# Patient Record
Sex: FEMALE | URBAN - METROPOLITAN AREA
[De-identification: names, ages, dates, MRNs, and addresses within clinical notes are randomized per-mention and may not be internally consistent; named-entity substitution may affect disease eponyms.]

---

## 2023-10-02 ENCOUNTER — TELEPHONE ENCOUNTER (OUTPATIENT)
Dept: URBAN - METROPOLITAN AREA CLINIC 98 | Facility: CLINIC | Age: 43
End: 2023-10-02

## 2023-10-03 ENCOUNTER — TELEPHONE ENCOUNTER (OUTPATIENT)
Dept: URBAN - METROPOLITAN AREA CLINIC 98 | Facility: CLINIC | Age: 43
End: 2023-10-03

## 2023-10-05 ENCOUNTER — WEB ENCOUNTER (OUTPATIENT)
Dept: URBAN - METROPOLITAN AREA CLINIC 98 | Facility: CLINIC | Age: 43
End: 2023-10-05

## 2023-10-06 ENCOUNTER — OFFICE VISIT (OUTPATIENT)
Dept: URBAN - METROPOLITAN AREA CLINIC 98 | Facility: CLINIC | Age: 43
End: 2023-10-06
Payer: COMMERCIAL

## 2023-10-06 VITALS
HEART RATE: 93 BPM | HEIGHT: 66 IN | BODY MASS INDEX: 28.67 KG/M2 | SYSTOLIC BLOOD PRESSURE: 154 MMHG | WEIGHT: 178.4 LBS | DIASTOLIC BLOOD PRESSURE: 93 MMHG | TEMPERATURE: 97 F

## 2023-10-06 DIAGNOSIS — Z33.1 CURRENT PREGNANCY DETERMINED BY HISTORY: ICD-10-CM

## 2023-10-06 DIAGNOSIS — D13.4 HEPATIC ADENOMA: ICD-10-CM

## 2023-10-06 PROCEDURE — 99245 OFF/OP CONSLTJ NEW/EST HI 55: CPT | Performed by: INTERNAL MEDICINE

## 2023-10-06 PROCEDURE — 99205 OFFICE O/P NEW HI 60 MIN: CPT | Performed by: INTERNAL MEDICINE

## 2023-10-06 NOTE — HPI-TODAY'S VISIT:
Here on urgent request from OB Dr Lurdes Brian for evaluation of hepatic adenomas in setting of pregnancy - at 35 wks today. Dr Brian needed an urgent hepatology opinion prior to delivery - if there is significant risk for rupture of adenoma with imminent delivery.  A copy of this note will be sent to Dr Brian.  This is her 7th pregnancy but 3rd child - other children are 4.4yo and 2.6 yo This was an ivf pregnancy after several trisomy 16 losses Pt known to have adenomas for years ; treated in [place] w ablations - followed by Dr Martines in [place] but not in several years.  She feels well ; no abdominal pain  not clear what she will do for contraception after delivery but she has avoided use of estrogen compounds since diagnosis of adenomas

## 2023-12-06 ENCOUNTER — LAB OUTSIDE AN ENCOUNTER (OUTPATIENT)
Dept: URBAN - METROPOLITAN AREA CLINIC 98 | Facility: CLINIC | Age: 43
End: 2023-12-06

## 2023-12-22 ENCOUNTER — LAB OUTSIDE AN ENCOUNTER (OUTPATIENT)
Dept: URBAN - METROPOLITAN AREA CLINIC 98 | Facility: CLINIC | Age: 43
End: 2023-12-22

## 2023-12-22 ENCOUNTER — TELEPHONE ENCOUNTER (OUTPATIENT)
Dept: URBAN - METROPOLITAN AREA CLINIC 98 | Facility: CLINIC | Age: 43
End: 2023-12-22

## 2024-01-07 NOTE — HPI-OTHER HISTORIES
US NS 9/2023  REPORT ULTRASOUND RIGHT UPPER QUADRANT ABDOMEN  CLINICAL HISTORY: Benign neoplasm of liver  COMPARISON: No prior study for comparison.  FINDINGS: Liver: Normal size, contour, and echogenicity. There is a hyperechoic focus in the right lobe of the liver measuring 4.8 x 4.1 x 5.3 cm with internal vascularity. Main portal vein and hepatic veins are patent, with appropriate direction of flow. Intra and Extrahepatic Bile Ducts: Normal.  CBD measures 6 mm in diameter. Gallbladder: Cholecystectomy Pancreas: The visualized portions of pancreas appear normal. The pancreatic tail is obscured by bowel gas. Right Kidney: Measures 11.2 cm in long axis. Normal size and echogenicity. No hydronephrosis. No masses. No calculi. Ascites: None.   IMPRESSION:  1. 5.3 cm hyperechoic liver lesion is incompletely assessed by ultrasound. This may reflect a hemangioma or adenoma. A more aggressive process is considered less likely given the patient's age. Consider comparison to prior exams or further evaluation with abdominal MRI without and with contrast when the patient is no longer pregnant. 2. Cholecystectomy  US [place] 2020 PROCEDURE: LIMITED RIGHT UPPER QUADRANT ABDOMINAL ULTRASOUND  HISTORY: 40-year-old female, currently pregnant, with history of hepatic adenomas.   COMPARISON: Prior Limited abdominal ultrasound from 5/30/2019, 1/7/2019, and 7/12/2018  TECHNIQUE: Imaging of the abdomen was performed in transverse and longitudinal planes with real time ultrasonography.   FINDINGS:   The pancreatic head, neck, and body are within normal limits. The tail is obscured by overlapping structures.  Normal echogenicity and appearance of the liver. Normal hepatic vein and portal venous flow. No focal liver lesions.  The gallbladder is not seen. The common bile duct measures 4 mm, previously 8 mm.  The right kidney measures 11.4 cm in craniocaudal dimension with normal cortical echogenicity and normal cortical thickness.  The visualized upper abdominal aorta and IVC are unremarkable. No ascites.  IMPRESSION:  The previously demonstrated echogenic liver lesions are not seen on today's exam. If clinically indicated, an MRI of the liver with Eovist can be considered for further evaluation.  Status post cholecystectomy with decreasing size of common bile duct, now measuring 4 mm.   [facility] 2019  Study Result Clinical History: Hepatic adenoma. Prior cholecystectomy.  Findings:  Comparison Study: 01/07/2019.  Multiple realtime images of the right upper quadrant demonstrated nonvisualization of the gallbladder consistent with prior cholecystectomy. The common duct is mildly dilated measuring 8 mm at the kristy. The distal duct is obscured by bowel gas The liver showed a 2.2 x 2.1 x 2.1 cm mildly hyperechoic focus in the right lobe. An adjacent 1 x 1.2 x 1.1 cm mildly hyperechoic focus is identified. The size has shown no gross change. Additional small scattered echogenic foci in the liver measuring up to 1 cm.  Imaged pancreas appeared unremarkable.  The right kidney measured 11.2 cm in length and showed no hydronephrosis. Renal echogenicity is within normal limits.  Opinion:  1. Status post cholecystectomy. Mildly dilated common duct may be related to prior cholecystectomy and does not necessarily indicate biliary obstruction. 2. History of hepatic adenoma. Previously seen 2 echogenic foci in the right lobe of liver remaining approximately stable. Additional small hyperintense foci are seen, nonspecific. MRI with Eovist may be considered for further characterization.    [facility] Multidisciplinary Hepatobiliary Conference Summary Report   Ladi Goins was presented and discussed at our biweekly multidisciplinary hepatobiliary conference today.  We presented and reviewed the patients most recent imaging (Ultrasound studies) from 09/21/2023.   The images revealed 5.3 cm mass.     The multidisciplinary care plan is Dr. Myers to see in clinic 10/9/2023. No follow up imaging at this time.    Care plan routed to care team and patient's coordinator to call and discuss.     Carmen Chi RN [facility]   CC: No ref. provider found Messi Barrera
ambulatory

## 2024-01-15 ENCOUNTER — LAB OUTSIDE AN ENCOUNTER (OUTPATIENT)
Dept: URBAN - METROPOLITAN AREA CLINIC 98 | Facility: CLINIC | Age: 44
End: 2024-01-15

## 2024-02-09 ENCOUNTER — TELEP (OUTPATIENT)
Dept: URBAN - METROPOLITAN AREA TELEHEALTH 2 | Facility: TELEHEALTH | Age: 44
End: 2024-02-09
Payer: COMMERCIAL

## 2024-02-09 VITALS — WEIGHT: 165 LBS | BODY MASS INDEX: 26.52 KG/M2 | HEIGHT: 66 IN

## 2024-02-09 DIAGNOSIS — D13.4 HEPATIC ADENOMA: ICD-10-CM

## 2024-02-09 DIAGNOSIS — K76.89 FOCAL NODULAR HYPERPLASIA OF LIVER: ICD-10-CM

## 2024-02-09 PROBLEM — 278527001: Status: ACTIVE | Noted: 2024-02-09

## 2024-02-09 PROCEDURE — 99214 OFFICE O/P EST MOD 30 MIN: CPT | Performed by: INTERNAL MEDICINE

## 2024-02-09 NOTE — HPI-OTHER HISTORIES
HEre to follow up for liver lesion: fast delivery , 9.5 lb at birth had bad tongue tie and lip tie - hard time with nursing  circumcision couldn't be done until 3 wk after - complicated by hemorrhage  testing now for coagulopathy - has a FH spherocytosis . she did labs but through her PCP : CMP wnl - 13lbs now 3month.after delivery - no more pain in "liver" considering another child in 1 year.  . MRI [place] 2024 Jan 23  MRI ABDOMEN WITH CONTRAST:Liver is noncirrhotic in morphology. Mildly T2 hyperintense enhancing lesion within the right hepatic lobe,segment 5/6 measuring 5.1 x 4.4 x 5 cm (series 12 image 37). Thisdemonstrates avid arterial phase enhancement and remains slightlyhyperintense to background liver parenchyma on the delayed phasescompatible with Eovist retention. Imaging pattern is most consistentwith an FNH. No additional focal hepatic lesion identified. Central hepatic veins, portal veins, SMV, and splenic vein are patent. Spleen is normal in size. T2 hyperintense splenic cyst anteriorlymeasuring 2 cm (series 7 image 16), without appreciable internalenhancement. Additional smaller T2 hyperintense nonenhancing spleniccysts (series 7 image 33, 31, 29). No adrenal nodularity. Pancreas enhances homogeneously without ductal dilatation or discretepancreatic mass. Gallbladder is absent. No biliary ductal dilatation. No evidence ofcholedocholithiasis. Kidneys enhance symmetrically without hydronephrosis or solid enhancingrenal mass. Abdominal aorta is not aneurysmal. Celiac artery, SMA, renal arteries,and proximal SAVANNAH are patent. No pathologic mesenteric or retroperitoneal lymphadenopathy. No gross bowel dilatation. No abnormal marrow signal. IMPRESSION: 1.  Findings consistent with an FNH in the right hepatic lobe measuring5.1 x 4.4 x 5 cm. This corresponds to the lesion seen on the most recentprior outside ultrasound from 9/21/2023. 2.  No additional focal hepatic lesion identified. Liver is noncirrhoticin morphology. Patent hepatic vasculature. 3.  Incidental finding of multiple nonenhancing splenic cysts measuringup to 2 cm. 4.  Status post cholecystectomy. No biliary ductal dilatation.

## 2024-02-15 LAB
A/G RATIO: 2
AFP, SERUM, TUMOR MARKER: 4.2
ALBUMIN: 4.7
ALKALINE PHOSPHATASE: 85
ALT (SGPT): 42
AST (SGOT): 31
BASO (ABSOLUTE): 0
BASOS: 0
BILIRUBIN, DIRECT: 0.16
BILIRUBIN, TOTAL: 0.6
BUN/CREATININE RATIO: 14
BUN: 10
CALCIUM: 9.3
CARBON DIOXIDE, TOTAL: 23
CHLORIDE: 102
CREATININE: 0.7
EGFR: 111
EOS (ABSOLUTE): 0
EOS: 1
GLOBULIN, TOTAL: 2.4
GLUCOSE: 85
HEMATOCRIT: 41
HEMATOLOGY COMMENTS:: (no result)
HEMOGLOBIN: 13.5
IMMATURE CELLS: (no result)
IMMATURE GRANS (ABS): 0
IMMATURE GRANULOCYTES: 0
LYMPHS (ABSOLUTE): 1.9
LYMPHS: 38
MCH: 31.7
MCHC: 32.9
MCV: 96
MONOCYTES(ABSOLUTE): 0.4
MONOCYTES: 7
NEUTROPHILS (ABSOLUTE): 2.8
NEUTROPHILS: 54
NRBC: (no result)
PLATELETS: 311
POTASSIUM: 4.9
PROTEIN, TOTAL: 7.1
RBC: 4.26
RDW: 12
SODIUM: 142
WBC: 5.2

## 2024-08-05 ENCOUNTER — DASHBOARD ENCOUNTERS (OUTPATIENT)
Age: 44
End: 2024-08-05

## 2024-08-05 ENCOUNTER — OFFICE VISIT (OUTPATIENT)
Dept: URBAN - METROPOLITAN AREA CLINIC 98 | Facility: CLINIC | Age: 44
End: 2024-08-05
Payer: COMMERCIAL

## 2024-08-05 VITALS
SYSTOLIC BLOOD PRESSURE: 139 MMHG | HEIGHT: 66 IN | DIASTOLIC BLOOD PRESSURE: 83 MMHG | HEART RATE: 94 BPM | TEMPERATURE: 97.2 F | BODY MASS INDEX: 26.2 KG/M2 | WEIGHT: 163 LBS

## 2024-08-05 DIAGNOSIS — R10.9 ABDOMINAL CRAMPS: ICD-10-CM

## 2024-08-05 DIAGNOSIS — K52.89 (LYMPHOCYTIC) MICROSCOPIC COLITIS: ICD-10-CM

## 2024-08-05 PROCEDURE — 99214 OFFICE O/P EST MOD 30 MIN: CPT | Performed by: INTERNAL MEDICINE

## 2024-08-05 NOTE — HPI-TODAY'S VISIT:
Ms. Goins is a 43 yo F presenting for followup for diarrhea.  Last seen by Dr. Boone for liver lesion on 2/9/24  She had a spider bite in June and developed an infection and cellulitis  Put on clindamycin, 2 weeks later developed diarrhea For 3 weeks she has been having 6-8 loose BMs per day Having some mucus with this in the last week No blood in stools Went to urgent care and they recommended seeing GI No weight loss Has some abdominal cramping with BMs    I reviewed:  MRI abdomen 1/23/24 2/14/24 AFP: 4.2

## 2024-08-06 ENCOUNTER — LAB OUTSIDE AN ENCOUNTER (OUTPATIENT)
Dept: URBAN - METROPOLITAN AREA CLINIC 98 | Facility: CLINIC | Age: 44
End: 2024-08-06

## 2024-08-08 LAB
ALBUMIN: 4.6
ALKALINE PHOSPHATASE: 95
ALT (SGPT): 21
AST (SGOT): 20
BILIRUBIN, TOTAL: 0.4
BUN/CREATININE RATIO: 17
BUN: 11
CALCIUM: 9.5
CARBON DIOXIDE, TOTAL: 22
CHLORIDE: 101
CREATININE: 0.64
DEAMIDATED GLIADIN ABS, IGA: 2
DEAMIDATED GLIADIN ABS, IGG: 2
EGFR: 113
ENDOMYSIAL ANTIBODY IGA: NEGATIVE
GLOBULIN, TOTAL: 2.6
GLUCOSE: 83
IMMUNOGLOBULIN A, QN, SERUM: 124
POTASSIUM: 4
PROTEIN, TOTAL: 7.2
SODIUM: 137
T-TRANSGLUTAMINASE (TTG) IGA: <2
T-TRANSGLUTAMINASE (TTG) IGG: <2

## 2024-08-09 ENCOUNTER — WEB ENCOUNTER (OUTPATIENT)
Dept: URBAN - METROPOLITAN AREA CLINIC 98 | Facility: CLINIC | Age: 44
End: 2024-08-09

## 2024-08-09 ENCOUNTER — TELEPHONE ENCOUNTER (OUTPATIENT)
Dept: URBAN - METROPOLITAN AREA CLINIC 98 | Facility: CLINIC | Age: 44
End: 2024-08-09

## 2024-08-09 LAB
ADENOVIRUS F 40/41: NOT DETECTED
C. DIFFICILE CHEK (GDH), STOOL - QDX: POSITIVE
C. DIFFICILE TOXIN A/B, STOOL - QDX: POSITIVE
CALPROTECTIN, STOOL - QDX: (no result)
CAMPYLOBACTER: NOT DETECTED
CLOSTRIDIUM DIFFICILE: DETECTED
ENTAMOEBA HISTOLYTICA: NOT DETECTED
ENTEROAGGREGATIVE E.COLI: NOT DETECTED
ENTEROTOXIGENIC E.COLI: NOT DETECTED
ESCHERICHIA COLI O157: NOT DETECTED
GIARDIA LAMBLIA: NOT DETECTED
NOROVIRUS GI/GII: NOT DETECTED
PANCREATICELASTASE ELISA, STOOL: (no result)
ROTAVIRUS A: NOT DETECTED
SALMONELLA SPP.: NOT DETECTED
SHIGA-LIKE TOXIN PRODUCING E.COLI: NOT DETECTED
SHIGELLA SPP. / ENTEROINVASIVE E.COLI: NOT DETECTED
VIBRIO PARAHAEMOLYTICUS: NOT DETECTED
VIBRIO SPP.: NOT DETECTED
YERSINIA ENTEROCOLITICA: NOT DETECTED

## 2024-08-09 RX ORDER — VANCOMYCIN HYDROCHLORIDE 125 MG/1
1 CAPSULE CAPSULE ORAL
Qty: 40 CAPSULE | Refills: 0 | OUTPATIENT
Start: 2024-08-09 | End: 2024-08-19

## 2024-09-05 ENCOUNTER — OFFICE VISIT (OUTPATIENT)
Dept: URBAN - METROPOLITAN AREA CLINIC 98 | Facility: CLINIC | Age: 44
End: 2024-09-05
Payer: COMMERCIAL

## 2024-09-05 VITALS
BODY MASS INDEX: 25.43 KG/M2 | DIASTOLIC BLOOD PRESSURE: 79 MMHG | HEIGHT: 66 IN | HEART RATE: 94 BPM | TEMPERATURE: 97.1 F | WEIGHT: 158.2 LBS | SYSTOLIC BLOOD PRESSURE: 122 MMHG

## 2024-09-05 DIAGNOSIS — R10.84 ABDOMINAL PAIN, GENERALIZED: ICD-10-CM

## 2024-09-05 DIAGNOSIS — K59.1 CHRONIC FUNCTIONAL DIARRHEA: ICD-10-CM

## 2024-09-05 DIAGNOSIS — A04.72 C. DIFFICILE: ICD-10-CM

## 2024-09-05 PROCEDURE — 99214 OFFICE O/P EST MOD 30 MIN: CPT | Performed by: INTERNAL MEDICINE

## 2024-09-05 NOTE — HPI-TODAY'S VISIT:
Ms. Goins is a 45 yo F presenting for followup for diarrhea. Positive for c.diff.  8/5/24- last visit with me.  Felt better after vancomycin Having solid stools now- bristol 3-4 Having 1 BM per day, no blood Feels that dairy and gluten make her have more watery stools.    I reviewed:  8/5/24 celiac testing: negative  8/5/24 CMP: normal 8/6/24 Cdiff PCR, toxin pos, fecal ar elevated to 60s MRI abdomen 1/23/24 2/14/24 AFP: 4.2

## 2024-10-29 ENCOUNTER — TELEPHONE ENCOUNTER (OUTPATIENT)
Dept: URBAN - METROPOLITAN AREA CLINIC 98 | Facility: CLINIC | Age: 44
End: 2024-10-29

## 2024-10-29 NOTE — HPI-TODAY'S VISIT:
[facility]    EXAMINATION: MRI ABDOMEN W WO CONTRAST \~ HISTORY: History of hepatic adenomas status post resection. Follow-up liver lesion. \~ COMPARISON: MRI abdomen 1/23/2024, ultrasound 9/21/2023 \~ TECHNIQUE: Multiplanar and multisequence magnetic resonance imaging of the abdomen was performed both prior to and following the administration of 6.4 cc of Vueway contrast. There were no reported immediate complications. \~ FINDINGS: \~ No cardiomegaly or pericardial effusion. Imaged lung bases are clear. \~ Normal liver morphology. No significant iron or fat deposition within the liver. Stable 5.3 x 4.1 cm arterially enhancing, T2 hyperintense and T1 hypointense hepatic segment 5/6 lesion. Previously this demonstrated retention of contrast on the prior hepatobiliary phase. No new hepatic lesions are noted. \~ Spleen is normal in size. T2 hyperintense splenic cysts. The largest measures 1.4 cm and is decreased in size, previously measuring 2.0 cm. Status post cholecystectomy. No biliary ductal dilation. Pancreas is normal. Adrenal glands are normal. Kidneys are normal. \~ Normal caliber of the abdominal aorta and inferior vena cava. Mesenteric vasculature is patent. No lymphadenopathy within the abdomen. No ascites. No obstruction or pathological thickening of the imaged portions of the bowel. \~ No acute osseous abnormalities. \~ IMPRESSION: \~ Stable enhancing right hepatic lobe lesion. This is favored to reflect focal nodular hyperplasia, although given the history of prior hepatic adenomas a hepatic adenoma is not excluded (inflammatory and beta catenin subtypes can retain contrast on the hepatic biliary phase). No new liver lesions are noted. Morphologically the liver is normal. \~

## 2024-11-02 ENCOUNTER — WEB ENCOUNTER (OUTPATIENT)
Dept: URBAN - METROPOLITAN AREA CLINIC 98 | Facility: CLINIC | Age: 44
End: 2024-11-02

## 2024-11-04 ENCOUNTER — OFFICE VISIT (OUTPATIENT)
Dept: URBAN - METROPOLITAN AREA TELEHEALTH 2 | Facility: TELEHEALTH | Age: 44
End: 2024-11-04
Payer: COMMERCIAL

## 2024-11-04 VITALS — BODY MASS INDEX: 25.39 KG/M2 | WEIGHT: 158 LBS | HEIGHT: 66 IN

## 2024-11-04 DIAGNOSIS — D13.4 HEPATIC ADENOMA: ICD-10-CM

## 2024-11-04 DIAGNOSIS — K76.89 FOCAL NODULAR HYPERPLASIA OF LIVER: ICD-10-CM

## 2024-11-04 DIAGNOSIS — Z31.69 PROCREATIVE MANAGEMENT COUNSELING: ICD-10-CM

## 2024-11-04 PROCEDURE — 99214 OFFICE O/P EST MOD 30 MIN: CPT | Performed by: INTERNAL MEDICINE

## 2024-11-04 NOTE — PHYSICAL EXAM NEUROLOGIC:
oriented to person, place and time ; short and long term memory intact
Patient is not pregnant (male or female)

## 2024-11-04 NOTE — HPI-OTHER HISTORIES
10/2024  \*Unknown; EXAMINATION: MRI ABDOMEN W WO CONTRAST \~ HISTORY: History of hepatic adenomas status post resection. Follow-up liver lesion. \~ COMPARISON: MRI abdomen 1/23/2024, ultrasound 9/21/2023 \~ TECHNIQUE: Multiplanar and multisequence magnetic resonance imaging of the abdomen was performed both prior to and following the administration of 6.4 cc of Vueway contrast. There were no reported immediate complications. \~ FINDINGS: \~ No cardiomegaly or pericardial effusion. Imaged lung bases are clear. \~ Normal liver morphology. No significant iron or fat deposition within the liver. Stable 5.3 x 4.1 cm arterially enhancing, T2 hyperintense and T1 hypointense hepatic segment 5/6 lesion. Previously this demonstrated retention of contrast on the prior hepatobiliary phase. No new hepatic lesions are noted. \~ Spleen is normal in size. T2 hyperintense splenic cysts. The largest measures 1.4 cm and is decreased in size, previously measuring 2.0 cm. Status post cholecystectomy. No biliary ductal dilation. Pancreas is normal. Adrenal glands are normal. Kidneys are normal. \~ Normal caliber of the abdominal aorta and inferior vena cava. Mesenteric vasculature is patent. No lymphadenopathy within the abdomen. No ascites. No obstruction or pathological thickening of the imaged portions of the bowel. \~ No acute osseous abnormalities. \~ IMPRESSION: \~ Stable enhancing right hepatic lobe lesion. This is favored to reflect focal nodular hyperplasia, although given the history of prior hepatic adenomas a hepatic adenoma is not excluded (inflammatory and beta catenin subtypes can retain contrast on the hepatic biliary phase). No new liver lesions are noted. Morphologically the liver is normal. \~

## 2024-11-04 NOTE — HPI-TODAY'S VISIT:
Here to review her recent MRI liver at [facility] considering another [facility] transfer soon she generally feels well  no complaints
Detail Level: Generalized
Detail Level: Detailed

## 2024-11-06 ENCOUNTER — LAB OUTSIDE AN ENCOUNTER (OUTPATIENT)
Dept: URBAN - METROPOLITAN AREA CLINIC 98 | Facility: CLINIC | Age: 44
End: 2024-11-06

## 2024-11-11 ENCOUNTER — WEB ENCOUNTER (OUTPATIENT)
Dept: URBAN - METROPOLITAN AREA CLINIC 98 | Facility: CLINIC | Age: 44
End: 2024-11-11

## 2024-11-11 ENCOUNTER — TELEPHONE ENCOUNTER (OUTPATIENT)
Dept: URBAN - METROPOLITAN AREA CLINIC 98 | Facility: CLINIC | Age: 44
End: 2024-11-11

## 2024-11-11 LAB
ADENOVIRUS F 40/41: NOT DETECTED
C. DIFFICILE TOXIN A/B, STOOL - QDX: POSITIVE
CALPROTECTIN, STOOL - QDX: (no result)
CAMPYLOBACTER: NOT DETECTED
CLOSTRIDIUM DIFFICILE: DETECTED
ENTAMOEBA HISTOLYTICA: NOT DETECTED
ENTEROAGGREGATIVE E.COLI: NOT DETECTED
ENTEROTOXIGENIC E.COLI: NOT DETECTED
ESCHERICHIA COLI O157: NOT DETECTED
GIARDIA LAMBLIA: NOT DETECTED
NOROVIRUS GI/GII: NOT DETECTED
PANCREATICELASTASE ELISA, STOOL: (no result)
ROTAVIRUS A: NOT DETECTED
SALMONELLA SPP.: NOT DETECTED
SHIGA-LIKE TOXIN PRODUCING E.COLI: NOT DETECTED
SHIGELLA SPP. / ENTEROINVASIVE E.COLI: NOT DETECTED
VIBRIO PARAHAEMOLYTICUS: NOT DETECTED
VIBRIO SPP.: NOT DETECTED
YERSINIA ENTEROCOLITICA: NOT DETECTED

## 2024-11-18 ENCOUNTER — OFFICE VISIT (OUTPATIENT)
Dept: URBAN - METROPOLITAN AREA CLINIC 98 | Facility: CLINIC | Age: 44
End: 2024-11-18
Payer: COMMERCIAL

## 2024-11-18 VITALS
DIASTOLIC BLOOD PRESSURE: 78 MMHG | SYSTOLIC BLOOD PRESSURE: 118 MMHG | BODY MASS INDEX: 26.74 KG/M2 | HEIGHT: 66 IN | TEMPERATURE: 97.6 F | WEIGHT: 166.4 LBS | HEART RATE: 91 BPM

## 2024-11-18 DIAGNOSIS — A04.72 CLOSTRIDIUM DIFFICILE DIARRHEA: ICD-10-CM

## 2024-11-18 DIAGNOSIS — R10.30 LOWER ABDOMINAL PAIN: ICD-10-CM

## 2024-11-18 PROCEDURE — 99214 OFFICE O/P EST MOD 30 MIN: CPT | Performed by: INTERNAL MEDICINE

## 2024-11-18 NOTE — HPI-TODAY'S VISIT:
Ms. Goins is a 43 yo F presenting for followup for diarrhea. Positive for c.diff.  9/5/24- last visit with me. Seeing Dr. Boone for liver lesion,possible FNH  C.diff positive again 11/6/24- Dificid ordered and she is still taking it She feels the diarrhea has improved (was having 8 BMs per day) and she is having 1 soild BM per day for the last 2 days No blood in stools.  She was having abdominal pain originally, now pain is better on Dificid.    I reviewed:  11/6/24 QDx: pos c.diff PCR and toxin 8/5/24 celiac testing: negative  8/5/24 CMP: normal 8/6/24 Cdiff PCR, toxin pos, fecal ar elevated to 60s MRI abdomen 1/23/24 2/14/24 AFP: 4.2

## 2024-12-02 ENCOUNTER — LAB OUTSIDE AN ENCOUNTER (OUTPATIENT)
Dept: URBAN - METROPOLITAN AREA TELEHEALTH 2 | Facility: TELEHEALTH | Age: 44
End: 2024-12-02

## 2024-12-16 ENCOUNTER — OFFICE VISIT (OUTPATIENT)
Dept: URBAN - METROPOLITAN AREA CLINIC 98 | Facility: CLINIC | Age: 44
End: 2024-12-16

## 2024-12-20 ENCOUNTER — OFFICE VISIT (OUTPATIENT)
Dept: URBAN - METROPOLITAN AREA CLINIC 98 | Facility: CLINIC | Age: 44
End: 2024-12-20

## 2025-05-09 ENCOUNTER — OFFICE VISIT (OUTPATIENT)
Dept: URBAN - METROPOLITAN AREA CLINIC 98 | Facility: CLINIC | Age: 45
End: 2025-05-09
Payer: COMMERCIAL

## 2025-05-09 ENCOUNTER — LAB OUTSIDE AN ENCOUNTER (OUTPATIENT)
Dept: URBAN - METROPOLITAN AREA CLINIC 98 | Facility: CLINIC | Age: 45
End: 2025-05-09

## 2025-05-09 DIAGNOSIS — Z33.1 CURRENT PREGNANCY DETERMINED BY HISTORY: ICD-10-CM

## 2025-05-09 DIAGNOSIS — K76.89 FOCAL NODULAR HYPERPLASIA OF LIVER: ICD-10-CM

## 2025-05-09 DIAGNOSIS — D13.4 HEPATIC ADENOMA: ICD-10-CM

## 2025-05-09 PROCEDURE — 99214 OFFICE O/P EST MOD 30 MIN: CPT | Performed by: INTERNAL MEDICINE

## 2025-05-09 NOTE — HPI-TODAY'S VISIT:
Seen in the past for liver lesion (last visit 2024)  lesion deemed FNH v adenoma per Nya review  in interim, seen by Dr Serrano now 13 weeks pregnant ; seeing Dr Brian  due date 11/14/2024

## 2025-05-27 ENCOUNTER — TELEPHONE ENCOUNTER (OUTPATIENT)
Dept: URBAN - METROPOLITAN AREA CLINIC 98 | Facility: CLINIC | Age: 45
End: 2025-05-27

## 2025-05-27 NOTE — HPI-TODAY'S VISIT:
[place] HISTORY: 45-year-old female status post MRI on 10/24/2024 which reported a stable enhancing right hepatic lesion favored to be focal nodular hyperplasia or possibly hepatic adenoma. History includes cholecystectomy.   COMPARISON: Ultrasound [facility] 9/21/2023, ultrasound [facility] 9/10/2020, MRI 10/24/2024   FINDINGS: Body of pancreas unremarkable. Remainder of pancreas not well visualized due to bowel gas and body habitus.   Visualized IVC is unremarkable.   The liver is homogeneous in echotexture. There is a 5.2 x 5.4 x 4.8 cm mildly echogenic lesion with mildly lobulated borders in right lobe of liver. It is similar in size to prior MRI. Very mild increase from prior ultrasound of 9/21/2023.   The common bile duct measures 4 mm. This is within normal limits.   The right kidney measures 11.8 cm. No hydronephrosis or focal renal mass.   IMPRESSION: 1. There is a 5.4 cm liver lesion which is similar in size to MRI of 10/24/2024. Very mild increase in size from prior ultrasound. A follow-up contrast-enhanced MRI or ultrasound in 6 months recommended.